# Patient Record
Sex: FEMALE | Race: WHITE | NOT HISPANIC OR LATINO | Employment: FULL TIME | URBAN - METROPOLITAN AREA
[De-identification: names, ages, dates, MRNs, and addresses within clinical notes are randomized per-mention and may not be internally consistent; named-entity substitution may affect disease eponyms.]

---

## 2020-06-04 ENCOUNTER — TELEPHONE (OUTPATIENT)
Dept: GASTROENTEROLOGY | Facility: CLINIC | Age: 60
End: 2020-06-04

## 2023-10-16 ENCOUNTER — TELEPHONE (OUTPATIENT)
Dept: GASTROENTEROLOGY | Facility: CLINIC | Age: 63
End: 2023-10-16

## 2023-10-16 ENCOUNTER — OFFICE VISIT (OUTPATIENT)
Dept: GASTROENTEROLOGY | Facility: CLINIC | Age: 63
End: 2023-10-16
Payer: COMMERCIAL

## 2023-10-16 VITALS
HEIGHT: 62 IN | WEIGHT: 222 LBS | BODY MASS INDEX: 40.85 KG/M2 | DIASTOLIC BLOOD PRESSURE: 80 MMHG | SYSTOLIC BLOOD PRESSURE: 138 MMHG

## 2023-10-16 DIAGNOSIS — R14.0 BLOATING: Primary | ICD-10-CM

## 2023-10-16 DIAGNOSIS — G47.33 OSA (OBSTRUCTIVE SLEEP APNEA): ICD-10-CM

## 2023-10-16 DIAGNOSIS — Z90.49 H/O PARTIAL RESECTION OF COLON: ICD-10-CM

## 2023-10-16 DIAGNOSIS — Z86.010 HISTORY OF COLON POLYPS: ICD-10-CM

## 2023-10-16 PROCEDURE — 99204 OFFICE O/P NEW MOD 45 MIN: CPT | Performed by: INTERNAL MEDICINE

## 2023-10-16 RX ORDER — LISINOPRIL 40 MG/1
TABLET ORAL
COMMUNITY

## 2023-10-16 RX ORDER — BUPROPION HYDROCHLORIDE 300 MG/1
TABLET ORAL
COMMUNITY

## 2023-10-16 RX ORDER — FLUTICASONE PROPIONATE 50 MCG
SPRAY, SUSPENSION (ML) NASAL
COMMUNITY
Start: 2023-08-17

## 2023-10-16 RX ORDER — LORAZEPAM 1 MG/1
TABLET ORAL
COMMUNITY
Start: 2023-09-19

## 2023-10-16 RX ORDER — BUSPIRONE HYDROCHLORIDE 15 MG/1
TABLET ORAL
COMMUNITY

## 2023-10-16 NOTE — PROGRESS NOTES
[facility] - Outpatient Consultation  Darrion Donahue 61 y.o. female MRN: [id number]  Encounter: [id number]    ASSESSMENT AND PLAN:      1. Bloating  63F here today for intermittent bloating. Occ constipation likely contributing along with a diet without sig variety - mainly eats steaks and rice w no vegetables. - increase water intake  - cont weight loss  - high fiber diet  - does not take dairy  - check a colonoscopy   - Stress management  - obtain blood work from [employer] completed within the year    2. History of colon polyps  Last colon in 2014 was incomplete due to sig stricture. Did have adenoma. S/p sig resection in 2015. Never followed up for her colonoscopy due to covid. - Schedule Colonoscopy @ [facility].  - polyethylene glycol (COLYTE) 4000 mL solution; Take 4,000 mL by mouth once for 1 dose Take 4000 mL by mouth once for 1 dose. Use as directed  Dispense: 4000 mL; Refill: 0    3. ISRAEL (obstructive sleep apnea)  BMI 41, will schedule at hospital    4. H/O partial resection of colon      Followup Appointment: 3 months  ______________________________________________________________________    Chief Complaint   Patient presents with   • Bloated       HPI:   Darrion Donahue is a 61y.o. year old female who presents today because of her PCP for bloating. Patient states that she has been trying to lose weight over the past few years. Has lost about 30 pounds. Last week, she had some episodes of constipation with a lot of bloating, passing flatus. She admits that she generally eats a very protein heavy diet with no vegetables. Otherwise moves her bowels regularly. Denies bleeding. No significant pains although she does get some lower back pains at times.     Historical Information   Past Medical History:   Diagnosis Date   • Cholelithiasis    • Diverticulitis of colon    • Hypertension    • Irritable bowel syndrome      Past Surgical History: Procedure Laterality Date   • ABDOMINAL SURGERY  2015    colon resection   • CHOLECYSTECTOMY  2020   • COLONOSCOPY       Social History     Substance and Sexual Activity   Alcohol Use Never     Social History     Substance and Sexual Activity   Drug Use Never     Social History     Tobacco Use   Smoking Status Never   Smokeless Tobacco Never     Family History   Problem Relation Age of Onset   • Colon polyps Mother    • Depression Mother    • Hypertension Mother    • Cancer Father         bone/lung cancer   • Diabetes Paternal Grandmother    • Colon cancer Neg Hx        Meds/Allergies     Current Outpatient Medications:   •  buPROPion (Wellbutrin XL) 300 mg 24 hr tablet  •  busPIRone (BUSPAR) 15 mg tablet  •  fluticasone (FLONASE) 50 mcg/act nasal spray  •  lisinopril (ZESTRIL) 40 mg tablet  •  LORazepam (ATIVAN) 1 mg tablet  •  polyethylene glycol (COLYTE) 4000 mL solution    Allergies   Allergen Reactions   • Levaquin [Levofloxacin] Blisters     Ended up in hospital       PHYSICAL EXAM:    Blood pressure 138/80, height 5' 1.5" (1.562 m), weight 101 kg (222 lb). Body mass index is 41.27 kg/m². General Appearance: NAD, cooperative, alert  Eyes: Anicteric, PERRLA, EOMI  ENT:  Normocephalic, atraumatic, normal mucosa. Neck:  Supple, symmetrical, trachea midline,   Resp:  Clear to auscultation bilaterally; no rales, rhonchi or wheezing; respirations unlabored   CV:  S1 S2, Regular rate and rhythm; no murmur, rub, or gallop. GI:  Soft, non-tender, non-distended; normal bowel sounds; no masses, no organomegaly although exam limited by body habitus  Musculoskeletal: No cyanosis, clubbing or edema. Normal ROM. Skin:  No jaundice, rashes, or lesions   Heme/Lymph: No palpable cervical lymphadenopathy  Psych: Normal affect, good eye contact  Neuro: No gross deficits, AAOx3    Lab Results:    We will obtain labs from [address]:    CONSTITUTIONAL: Denies any fever, chills, rigors, and unintentional weight loss. HEENT: No earache or tinnitus. Denies hearing loss or visual disturbances. CARDIOVASCULAR: No chest pain or palpitations. RESPIRATORY: Denies any cough, hemoptysis, shortness of breath or dyspnea on exertion. GASTROINTESTINAL: As noted in the History of Present Illness. GENITOURINARY: No problems with urination. Denies any hematuria or dysuria. NEUROLOGIC: No dizziness or vertigo, denies headaches. MUSCULOSKELETAL: Chronic lower back pains  SKIN: Denies skin rashes or itching. ENDOCRINE: Denies excessive thirst. Denies intolerance to heat or cold.   PSYCHOSOCIAL: + anxiety

## 2023-10-16 NOTE — TELEPHONE ENCOUNTER
Scheduled date of colonoscopy (as of today):12/19/23  Physician performing colonoscopy:ANDREW  Location of colonoscopy:[facility]  Bowel prep reviewed with patient:Mariana  Instructions reviewed with patient by:MARK  Clearances: N

## 2023-11-27 ENCOUNTER — TELEPHONE (OUTPATIENT)
Dept: GASTROENTEROLOGY | Facility: CLINIC | Age: 63
End: 2023-11-27

## 2023-11-27 DIAGNOSIS — Z86.010 HISTORY OF COLON POLYPS: Primary | ICD-10-CM

## 2023-11-29 ENCOUNTER — TELEPHONE (OUTPATIENT)
Age: 63
End: 2023-11-29

## 2023-11-29 NOTE — TELEPHONE ENCOUNTER
Patients GI provider:  Dr. Kashif Randolph    Number to return call: [phone]     Reason for call: Pt called to reschedule colonoscopy at [facility] please review and reach out thank you     Scheduled procedure/appointment date if applicable: procedure 45/36/8191

## 2023-12-05 NOTE — TELEPHONE ENCOUNTER
Returned pt's call to reschedule colon at [facility] 12/19/23 case has been cancelled

## 2024-01-15 ENCOUNTER — TELEPHONE (OUTPATIENT)
Dept: GASTROENTEROLOGY | Facility: CLINIC | Age: 64
End: 2024-01-15

## 2024-01-15 NOTE — TELEPHONE ENCOUNTER
No response from [facility]  Procedure confirmed  Colonoscopy     Via: Spoke with patient.      Instructions given: MyChart     Prep Given: Miralax/Dulcolax    Call the office if there are any questions.